# Patient Record
Sex: FEMALE | Race: WHITE
[De-identification: names, ages, dates, MRNs, and addresses within clinical notes are randomized per-mention and may not be internally consistent; named-entity substitution may affect disease eponyms.]

---

## 2017-10-17 ENCOUNTER — RX RENEWAL (OUTPATIENT)
Age: 37
End: 2017-10-17

## 2017-11-17 ENCOUNTER — RX RENEWAL (OUTPATIENT)
Age: 37
End: 2017-11-17

## 2017-12-08 ENCOUNTER — MEDICATION RENEWAL (OUTPATIENT)
Age: 37
End: 2017-12-08

## 2018-01-22 ENCOUNTER — MEDICATION RENEWAL (OUTPATIENT)
Age: 38
End: 2018-01-22

## 2018-03-19 ENCOUNTER — RX RENEWAL (OUTPATIENT)
Age: 38
End: 2018-03-19

## 2018-03-19 RX ORDER — ALPRAZOLAM 0.5 MG/1
0.5 TABLET ORAL
Qty: 30 | Refills: 0 | Status: COMPLETED | COMMUNITY
Start: 2017-10-17 | End: 2018-03-19

## 2018-03-19 RX ORDER — ALPRAZOLAM 0.5 MG/1
0.5 TABLET ORAL
Qty: 30 | Refills: 0 | Status: COMPLETED | COMMUNITY
Start: 2017-12-08 | End: 2018-03-19

## 2018-04-23 ENCOUNTER — APPOINTMENT (OUTPATIENT)
Dept: NEUROLOGY | Facility: CLINIC | Age: 38
End: 2018-04-23
Payer: COMMERCIAL

## 2018-04-23 VITALS
WEIGHT: 185 LBS | HEIGHT: 62 IN | SYSTOLIC BLOOD PRESSURE: 126 MMHG | BODY MASS INDEX: 34.04 KG/M2 | DIASTOLIC BLOOD PRESSURE: 80 MMHG

## 2018-04-23 DIAGNOSIS — Z87.891 PERSONAL HISTORY OF NICOTINE DEPENDENCE: ICD-10-CM

## 2018-04-23 DIAGNOSIS — Z78.9 OTHER SPECIFIED HEALTH STATUS: ICD-10-CM

## 2018-04-23 PROCEDURE — 99213 OFFICE O/P EST LOW 20 MIN: CPT

## 2018-04-23 RX ORDER — NALTREXONE HYDROCHLORIDE AND BUPROPION HYDROCHLORIDE 8; 90 MG/1; MG/1
8-90 TABLET, EXTENDED RELEASE ORAL
Qty: 120 | Refills: 0 | Status: ACTIVE | COMMUNITY
Start: 2018-03-15

## 2018-04-23 RX ORDER — EFINACONAZOLE 100 MG/ML
10 SOLUTION TOPICAL
Qty: 4 | Refills: 0 | Status: COMPLETED | COMMUNITY
Start: 2018-04-06

## 2018-04-23 RX ORDER — TRETINOIN 0.25 MG/G
0.03 GEL TOPICAL
Qty: 30 | Refills: 0 | Status: COMPLETED | COMMUNITY
Start: 2018-04-06

## 2018-04-23 RX ORDER — CLINDAMYCIN PHOSPHATE 1 G/10ML
1 GEL TOPICAL
Qty: 30 | Refills: 0 | Status: COMPLETED | COMMUNITY
Start: 2018-04-06

## 2018-04-23 RX ORDER — ECONAZOLE NITRATE 10 MG/G
1 AEROSOL, FOAM TOPICAL
Qty: 70 | Refills: 0 | Status: COMPLETED | COMMUNITY
Start: 2018-04-04

## 2018-05-22 ENCOUNTER — MEDICATION RENEWAL (OUTPATIENT)
Age: 38
End: 2018-05-22

## 2018-07-09 ENCOUNTER — MEDICATION RENEWAL (OUTPATIENT)
Age: 38
End: 2018-07-09

## 2018-07-10 ENCOUNTER — RX RENEWAL (OUTPATIENT)
Age: 38
End: 2018-07-10

## 2018-08-31 ENCOUNTER — RX RENEWAL (OUTPATIENT)
Age: 38
End: 2018-08-31

## 2018-08-31 RX ORDER — ALPRAZOLAM 0.5 MG/1
0.5 TABLET ORAL
Qty: 30 | Refills: 0 | Status: ACTIVE | COMMUNITY
Start: 2018-01-22 | End: 1900-01-01

## 2019-04-23 ENCOUNTER — APPOINTMENT (OUTPATIENT)
Dept: NEUROLOGY | Facility: CLINIC | Age: 39
End: 2019-04-23
Payer: COMMERCIAL

## 2019-04-23 VITALS
DIASTOLIC BLOOD PRESSURE: 80 MMHG | BODY MASS INDEX: 38.41 KG/M2 | WEIGHT: 225 LBS | SYSTOLIC BLOOD PRESSURE: 130 MMHG | HEIGHT: 64 IN

## 2019-04-23 DIAGNOSIS — R51 HEADACHE: ICD-10-CM

## 2019-04-23 PROCEDURE — 99213 OFFICE O/P EST LOW 20 MIN: CPT

## 2019-04-23 RX ORDER — AMITRIPTYLINE HYDROCHLORIDE 10 MG/1
10 TABLET, FILM COATED ORAL
Qty: 30 | Refills: 5 | Status: COMPLETED | COMMUNITY
Start: 2017-11-17 | End: 2019-04-23

## 2019-04-23 NOTE — ASSESSMENT
[FreeTextEntry1] : This is a 38-year-old woman with migraine headaches. At this time our new her Imitrex both oral and injectable. She can take this as needed. She also take Advil. These were both approved by her OB even though she is feeding breast milk. I will see her back in 3 months. If she changes her mind about preventive therapy and stops breast-feeding or pumping breast milk we can consider restarting amitriptyline or an alternative agent.

## 2019-04-23 NOTE — REVIEW OF SYSTEMS
[As Noted in HPI] : as noted in HPI [Migraine Headache] : migraine headaches [Negative] : Heme/Lymph

## 2019-04-23 NOTE — PHYSICAL EXAM
[Person] : oriented to person [Time] : oriented to time [Place] : oriented to place [Remote Intact] : remote memory intact [Registration Intact] : recent registration memory intact [Span Intact] : the attention span was normal [Concentration Intact] : normal concentrating ability [Visual Intact] : visual attention was ~T not ~L decreased [Naming Objects] : no difficulty naming common objects [Repeating Phrases] : no difficulty repeating a phrase [Comprehension] : comprehension intact [Fluency] : fluency intact [Current Events] : adequate knowledge of current events [Past History] : adequate knowledge of personal past history [Cranial Nerves Optic (II)] : visual acuity intact bilaterally,  visual fields full to confrontation, pupils equal round and reactive to light [Cranial Nerves Oculomotor (III)] : extraocular motion intact [Cranial Nerves Trigeminal (V)] : facial sensation intact symmetrically [Cranial Nerves Facial (VII)] : face symmetrical [Cranial Nerves Vestibulocochlear (VIII)] : hearing was intact bilaterally [Cranial Nerves Glossopharyngeal (IX)] : tongue and palate midline [Cranial Nerves Hypoglossal (XII)] : there was no tongue deviation with protrusion [Cranial Nerves Accessory (XI - Cranial And Spinal)] : head turning and shoulder shrug symmetric [Motor Strength] : muscle strength was normal in all four extremities [Motor Tone] : muscle tone was normal in all four extremities [Involuntary Movements] : no involuntary movements were seen [Motor Handedness Right-Handed] : the patient is right hand dominant [No Muscle Atrophy] : normal bulk in all four extremities [Paresis Pronator Drift Left-Sided] : no pronator drift on the left [Paresis Pronator Drift Right-Sided] : no pronator drift on the right [Sensation Tactile Decrease] : light touch was intact [Sensation Pain / Temperature Decrease] : pain and temperature was intact [Sensation Vibration Decrease] : vibration was intact [Proprioception] : proprioception was intact [Abnormal Walk] : normal gait [Tremor] : no tremor present [Balance] : balance was intact [Coordination - Dysmetria Impaired Finger-to-Nose Bilateral] : not present [2+] : Patella left 2+ [Sclera] : the sclera and conjunctiva were normal [PERRL With Normal Accommodation] : pupils were equal in size, round, reactive to light, with normal accommodation [Extraocular Movements] : extraocular movements were intact [No APD] : no afferent pupillary defect [Full Visual Field] : full visual field [No AKOSUA] : no internuclear ophthalmoplegia

## 2019-04-23 NOTE — CONSULT LETTER
[Dear  ___] : Dear  [unfilled], [Courtesy Letter:] : I had the pleasure of seeing your patient, [unfilled], in my office today. [Please see my note below.] : Please see my note below. [Consult Closing:] : Thank you very much for allowing me to participate in the care of this patient.  If you have any questions, please do not hesitate to contact me. [Sincerely,] : Sincerely, [FreeTextEntry3] : Tarun Galindo M.D., Ph.D. DPN-N\par [facility]\par Neurology [facility] [place]\par Medical Director of Stroke Services\par [facility]\par

## 2019-04-23 NOTE — HISTORY OF PRESENT ILLNESS
[FreeTextEntry1] : Followup April 23, 2018:\par This is a 37-year-old woman returns for followup of migraine headache. In between now and her last visit she had complained of weight gain with amitriptyline. I had switched her back to zonegran which wasn't helping and she is back on amitriptyline. Her weight has stabilized. Since switching back to the amitriptyline her migraine headaches have been well controlled. She uses ibuprofen plus sumatriptan when she gets a migraine it is really bad or she cannot tolerate p.o. for nausea she will use Imitrex injection. This does help her. She is here today for neurologic followup.\par \par Followup April 23, 2019:\par This is a 38-year-old woman who presents today for followup of headaches. Since her last visit she has given birth about a month ago. She been off preventive therapy during her pregnancy. She is not started back on preventive therapy as she is feeding and breast milk to the baby. She was given permission by the OB to take as dull and/or Imitrex when needed for breakthrough headaches. She is a bit hesitant to take a daily medicine again. She states her headaches are worse for the initial 2 weeks following delivery but it subsequently taper off of it. She is here today for neurologic followup.

## 2019-07-30 ENCOUNTER — APPOINTMENT (OUTPATIENT)
Dept: NEUROLOGY | Facility: CLINIC | Age: 39
End: 2019-07-30

## 2020-01-09 ENCOUNTER — RX RENEWAL (OUTPATIENT)
Age: 40
End: 2020-01-09

## 2020-05-22 ENCOUNTER — RX RENEWAL (OUTPATIENT)
Age: 40
End: 2020-05-22

## 2020-05-22 RX ORDER — SUMATRIPTAN 100 MG/1
100 TABLET, FILM COATED ORAL
Qty: 14 | Refills: 7 | Status: ACTIVE | COMMUNITY
Start: 2018-02-16 | End: 1900-01-01

## 2020-09-21 ENCOUNTER — RX RENEWAL (OUTPATIENT)
Age: 40
End: 2020-09-21

## 2020-09-21 RX ORDER — SUMATRIPTAN SUCCINATE 6 MG/.5ML
6 INJECTION SUBCUTANEOUS
Qty: 9 | Refills: 3 | Status: ACTIVE | COMMUNITY
Start: 2018-02-16 | End: 1900-01-01

## 2022-07-26 ENCOUNTER — APPOINTMENT (OUTPATIENT)
Dept: HUMAN REPRODUCTION | Facility: CLINIC | Age: 42
End: 2022-07-26